# Patient Record
Sex: MALE | Race: WHITE | NOT HISPANIC OR LATINO | Employment: FULL TIME | URBAN - METROPOLITAN AREA
[De-identification: names, ages, dates, MRNs, and addresses within clinical notes are randomized per-mention and may not be internally consistent; named-entity substitution may affect disease eponyms.]

---

## 2020-07-20 ENCOUNTER — ALLIED HEALTH/NURSE VISIT (OUTPATIENT)
Dept: FAMILY MEDICINE | Facility: CLINIC | Age: 45
End: 2020-07-20
Payer: COMMERCIAL

## 2020-07-20 ENCOUNTER — VIRTUAL VISIT (OUTPATIENT)
Dept: FAMILY MEDICINE | Facility: CLINIC | Age: 45
End: 2020-07-20
Payer: COMMERCIAL

## 2020-07-20 DIAGNOSIS — J06.9 VIRAL URI: ICD-10-CM

## 2020-07-20 DIAGNOSIS — J06.9 VIRAL URI: Primary | ICD-10-CM

## 2020-07-20 LAB
DEPRECATED S PYO AG THROAT QL EIA: NEGATIVE
SPECIMEN SOURCE: NORMAL
SPECIMEN SOURCE: NORMAL
STREP GROUP A PCR: NOT DETECTED

## 2020-07-20 PROCEDURE — 99203 OFFICE O/P NEW LOW 30 MIN: CPT | Mod: 95 | Performed by: NURSE PRACTITIONER

## 2020-07-20 PROCEDURE — 99207 ZZC NO CHARGE NURSE ONLY: CPT

## 2020-07-20 PROCEDURE — 40001204 ZZHCL STATISTIC STREP A RAPID: Performed by: NURSE PRACTITIONER

## 2020-07-20 PROCEDURE — 87651 STREP A DNA AMP PROBE: CPT | Performed by: NURSE PRACTITIONER

## 2020-07-20 NOTE — PROGRESS NOTES
"Javier Davis is a 44 year old male who is being evaluated via a billable video visit.      The patient has been notified of following:     \"This video visit will be conducted via a call between you and your physician/provider. We have found that certain health care needs can be provided without the need for an in-person physical exam.  This service lets us provide the care you need with a video conversation.  If a prescription is necessary we can send it directly to your pharmacy.  If lab work is needed we can place an order for that and you can then stop by our lab to have the test done at a later time.    Video visits are billed at different rates depending on your insurance coverage.  Please reach out to your insurance provider with any questions.    If during the course of the call the physician/provider feels a video visit is not appropriate, you will not be charged for this service.\"    Patient has given verbal consent for Video visit? Yes  How would you like to obtain your AVS? Mail a copy  If you are dropped from the video visit, the video invite should be resent to: Text to cell phone: [phone]  Will anyone else be joining your video visit? No    Subjective     Javier Davis is a 44 year old male who presents today via video visit for the following health issues:    HPI    Fatigue       Duration: Today     Description (location/character/radiation): Runny nose, HA, fatigue     Intensity:  mild, moderate    Accompanying signs and symptoms: no fever     History (similar episodes/previous evaluation): None    Precipitating or alleviating factors: exposure to strep     Therapies tried and outcome: ibu      Co worker with strep, friend  No sore throat, afebrile   No cough    Video Start Time: 3:41 PM    Patient Active Problem List   Diagnosis     Strain of groin     Strain of abdominal wall     History reviewed. No pertinent surgical history.    Social History     Tobacco Use     Smoking status: " Current Every Day Smoker     Smokeless tobacco: Never Used   Substance Use Topics     Alcohol use: Yes     History reviewed. No pertinent family history.      No current outpatient medications on file.     No Known Allergies    Reviewed and updated as needed this visit by Provider  Tobacco  Allergies  Meds  Problems  Med Hx  Surg Hx  Fam Hx         Review of Systems   Constitutional, HEENT, cardiovascular, pulmonary, gi and gu systems are negative, except as otherwise noted.      Objective             Physical Exam     GENERAL: Healthy, alert and no distress  EYES: Eyes grossly normal to inspection.  No discharge or erythema, or obvious scleral/conjunctival abnormalities.  RESP: No audible wheeze, cough, or visible cyanosis.  No visible retractions or increased work of breathing.    SKIN: Visible skin clear. No significant rash, abnormal pigmentation or lesions.  NEURO: Cranial nerves grossly intact.  Mentation and speech appropriate for age.  PSYCH: Mentation appears normal, affect normal/bright, judgement and insight intact, normal speech and appearance well-groomed.      Diagnostic Test Results:  Labs reviewed in Epic        Assessment & Plan     1. Viral URI  No acute distress. Will do strep testing with known exposure.  Symptomatic care and follow up discussed.  - Streptococcus A Rapid Scr w Reflx to PCR; Future     Home care instructions were reviewed with the patient. The risks, benefits and treatment options of prescribed medications or other treatments have been discussed with the patient. The patient verbalized their understanding and should call or follow up if no improvement or if they develop further problems.    Return in about 1 week (around 7/27/2020), or if symptoms worsen or fail to improve.    ARMANI Pastrana [facility]      Video-Visit Details    Type of service:  Video Visit    Video End Time:9880    Originating Location (pt. Location): Home    Distant  Location (provider location):  [facility]     Platform used for Video Visit: Doximity    Return in about 1 week (around 7/27/2020), or if symptoms worsen or fail to improve.       ARMANI Pastrana CNP

## 2020-07-20 NOTE — LETTER
July 20, 2020      Javier Davis  [id number] [address] [id number]        To Whom It May Concern:    Javier Davis was seen in our clinic. He missed work 7/20/2020-7/21/2020 due to illness.    Sincerely,        ARMANI Pastrana CNP

## 2020-08-06 ENCOUNTER — NURSE TRIAGE (OUTPATIENT)
Dept: NURSING | Facility: CLINIC | Age: 45
End: 2020-08-06

## 2020-08-06 ENCOUNTER — VIRTUAL VISIT (OUTPATIENT)
Dept: FAMILY MEDICINE | Facility: CLINIC | Age: 45
End: 2020-08-06
Payer: COMMERCIAL

## 2020-08-06 DIAGNOSIS — Z20.822 SUSPECTED COVID-19 VIRUS INFECTION: Primary | ICD-10-CM

## 2020-08-06 PROCEDURE — 99213 OFFICE O/P EST LOW 20 MIN: CPT | Mod: 95 | Performed by: FAMILY MEDICINE

## 2020-08-06 NOTE — PATIENT INSTRUCTIONS
"Discharge Instructions for COVID-19 Patients  You have--or may have--COVID-19. Please follow the instructions listed below.   If you have a weakened immune system, discuss with your doctor any other actions you need to take.  How can I protect others?  If you have symptoms (fever, cough, body aches or trouble breathing):    Stay home and away from others (self-isolate) until:  ? At least 10 days have passed since your symptoms started. And   ? You've had no fever--and no medicine that reduces fever--for 3 full days (72 hours). And   ? Your other symptoms have resolved (gotten better).  If you don't show symptoms, but testing showed that you have COVID-19:    Stay home and away from others (self-isolate) until at least 10 days have passed since the date of your first positive COVID-19 test.  During this time    Stay in your own room, even for meals. Use your own bathroom if you can.    Stay away from others in your home. No hugging, kissing or shaking hands. No visitors.    Don't go to work, school or anywhere else.    Clean \"high touch\" surfaces often (doorknobs, counters, handles). Use household cleaning spray or wipes. You'll find a full list of  on the EPA website: www.epa.gov/pesticide-registration/list-n-disinfectants-use-against-sars-cov-2.    Cover your mouth and nose with a mask, tissue or wash cloth to avoid spreading germs.    Wash your hands and face often. Use soap and water.    Caregivers in these groups are at risk for severe illness due to COVID-19:  ? People 65 years and older  ? People who live in a nursing home or long-term care facility  ? People with chronic disease (lung, heart, cancer, diabetes, kidney, liver, immunologic)  ? People who have a weakened immune system, including those who:    Are in cancer treatment    Take medicine that weakens the immune system, such as corticosteroids    Had a bone marrow or organ transplant    Have an immune deficiency    Have poorly controlled HIV or " AIDS    Are obese (body mass index of 40 or higher)    Smoke regularly    Caregivers should wear gloves while washing dishes, handling laundry and cleaning bedrooms and bathrooms.    Use caution when washing and drying laundry: Don't shake dirty laundry and use the warmest water setting that you can.    For more tips on managing your health at home, go to www.cdc.gov/coronavirus/2019-ncov/downloads/10Things.pdf.  How can I take care of myself at home?  1. Get lots of rest. Drink extra fluids (unless a doctor has told you not to).  2. Take Tylenol (acetaminophen) for fever or pain. If you have liver or kidney problems, ask your family doctor if it's okay to take Tylenol.     Adults can take either:  ? 650 mg (two 325 mg pills) every 4 to 6 hours, or   ? 1,000 mg (two 500 mg pills) every 8 hours as needed.  ? Note: Don't take more than 3,000 mg in one day. Acetaminophen is found in many medicines (both prescribed and over-the-counter medicines). Read all labels to be sure you don't take too much.   For children, check the Tylenol bottle for the right dose. The dose is based on the child's age or weight.  3. If you have other health problems (like cancer, heart failure, an organ transplant or severe kidney disease): Call your specialty clinic if you don't feel better in the next 2 days.  4. Know when to call 911. Emergency warning signs include:  ? Trouble breathing or shortness of breath  ? Pain or pressure in the chest that doesn't go away  ? Feeling confused like you haven't felt before, or not being able to wake up  ? Bluish-colored lips or face  5. Your doctor may have prescribed a blood thinner medicine. Follow their instructions.  Where can I get more information?     [facility] - About COVID-19:   [email or web address]    CDC - What to Do If You're Sick: www.cdc.gov/coronavirus/2019-ncov/about/steps-when-sick.html    CDC - Ending Home Isolation:  www.cdc.gov/coronavirus/2019-ncov/hcp/disposition-in-home-patients.html    CDC - Caring for Someone: www.cdc.gov/coronavirus/2019-ncov/if-you-are-sick/care-for-someone.html    [facility] - Interim Guidance for Hospital Discharge to Home: www.[facility].[place]/diseases/coronavirus/hcp/hospdischarge.pdf    [facility] clinical trials (COVID-19 research studies): clinicalaffairs.[facility]-clinical-trials    Below are the COVID-19 hotlines at the [place] Department of Health ([facility]). Interpreters are available.  ? For health questions: Call 051-573-0269 or 1-106.273.7498 (7 a.m. to 7 p.m.)  ? For questions about schools and childcare: Call 720-197-4978 or 1-469.941.3238 (7 a.m. to 7 p.m.)    For informational purposes only. Not to replace the advice of your health care provider. Clinically reviewed by the Infection Prevention Team.Copyright   2020 [facility]. All rights reserved. [employer] 939059 - 06/20.

## 2020-08-06 NOTE — TELEPHONE ENCOUNTER
Body aches, headache for two days now. Others at work are getting tested for covid19 today.  I connected him with scheduling because he has shortness of breath with walking. He needs to talk with a provider on the telephone to discuss covid19 testing.  Eileen Sanderson RN  [facility]      Reason for Disposition    MILD difficulty breathing (e.g., minimal/no SOB at rest, SOB with walking, pulse <100)    Additional Information    Negative: SEVERE difficulty breathing (e.g., struggling for each breath, speaks in single words)    Negative: Difficult to awaken or acting confused (e.g., disoriented, slurred speech)    Negative: Bluish (or gray) lips or face now    Negative: Shock suspected (e.g., cold/pale/clammy skin, too weak to stand, low BP, rapid pulse)    Negative: Sounds like a life-threatening emergency to the triager    Negative: [1] COVID-19 exposure AND [2] no symptoms    Negative: COVID-19 and Breastfeeding, questions about    Negative: [1] Adult with possible COVID-19 symptoms AND [2] triager concerned about severity of symptoms or other causes    Negative: SEVERE or constant chest pain or pressure (Exception: mild central chest pain, present only when coughing)    Negative: MODERATE difficulty breathing (e.g., speaks in phrases, SOB even at rest, pulse 100-120)    Negative: Patient sounds very sick or weak to the triager    Protocols used: CORONAVIRUS (COVID-19) DIAGNOSED OR [id number] 5.16.20

## 2020-08-06 NOTE — PROGRESS NOTES
"Javier Davis is a 44 year old male who is being evaluated via a billable telephone visit.      The patient has been notified of following:     \"This telephone visit will be conducted via a call between you and your physician/provider. We have found that certain health care needs can be provided without the need for a physical exam.  This service lets us provide the care you need with a short phone conversation.  If a prescription is necessary we can send it directly to your pharmacy.  If lab work is needed we can place an order for that and you can then stop by our lab to have the test done at a later time.    Telephone visits are billed at different rates depending on your insurance coverage. During this emergency period, for some insurers they may be billed the same as an in-person visit.  Please reach out to your insurance provider with any questions.    If during the course of the call the physician/provider feels a telephone visit is not appropriate, you will not be charged for this service.\"    Patient has given verbal consent for Telephone visit?  Yes    What phone number would you like to be contacted at? [phone]    How would you like to obtain your AVS? Mail a copy    Subjective     Javier Davis is a 44 year old male who presents via phone visit today for the following health issues:    HPI    Patient reports for the past 2 days has been having symptoms of cough, fever chills, headache.  He reports other coworker has similar symptoms however all tested negative.  His work wanted him to be tested.  Currently,  is under self-isolation.    He denies loss of smell sensation.  Denies loss of taste.  He has no chest pain no short of breath.  Denies being lightheaded or dizzy.  Eating drinking well.  Concern for COVID-19  About how many days ago did these symptoms start? Monday  Is this your first visit for this illness? Yes  In the 14 days before your symptoms started, have you had close contact " with someone with COVID-19 (Coronavirus)? Yes, I have been in contact with someone who has symptoms of COVID-19/Coronavirus (no lab test done or waiting on results).  Do you have a fever or chills? No  Are you having new or worsening difficulty breathing? No  Do you have new or worsening cough? No  Have you had any new or unexplained body aches? YES    Have you experienced any of the following NEW symptoms?    Headache: YES    Sore throat: No    Loss of taste or smell: No    Chest pain: fatigue    Diarrhea: YES    Rash: No  What treatments have you tried? Pepto on Tuesday  Who do you live with? Girlfriend, two children , and a coworker  Are you, or a household member, a healthcare worker or a ? No  Do you live in a nursing home, group home, or shelter? No  Do you have a way to get food/medications if quarantined? Yes, I have a friend or family member who can help me.      Patient Active Problem List   Diagnosis     Strain of groin     Strain of abdominal wall     History reviewed. No pertinent surgical history.    Social History     Tobacco Use     Smoking status: Current Every Day Smoker     Smokeless tobacco: Never Used   Substance Use Topics     Alcohol use: Yes     History reviewed. No pertinent family history.      No current outpatient medications on file.     No Known Allergies    Reviewed and updated as needed this visit by Provider         Review of Systems   Constitutional, HEENT, cardiovascular, pulmonary, gi and gu systems are negative, except as otherwise noted.       Objective   Reported vitals:  There were no vitals taken for this visit.   healthy, alert, active and no distress  Speak in full sentence.  PSYCH: Alert and oriented times 3; coherent speech, normal   rate and volume, able to articulate logical thoughts, able   to abstract reason, no tangential thoughts, no hallucinations   or delusions  His affect is normal and pleasant  RESP: No cough, no audible wheezing, able to talk in  "full sentences  Remainder of exam unable to be completed due to telephone visits    Diagnostic Test Results:  Labs reviewed in Epic  Orders Placed This Encounter   Procedures     Symptomatic COVID-19 Virus (Coronavirus) by PCR        Assessment/Plan:    1. Suspected COVID-19 virus infection    - Symptomatic COVID-19 Virus (Coronavirus) by PCR; Future    No follow-ups on file.    Patient Instructions   Discharge Instructions for COVID-19 Patients  You have--or may have--COVID-19. Please follow the instructions listed below.   If you have a weakened immune system, discuss with your doctor any other actions you need to take.  How can I protect others?  If you have symptoms (fever, cough, body aches or trouble breathing):    Stay home and away from others (self-isolate) until:  ? At least 10 days have passed since your symptoms started. And   ? You've had no fever--and no medicine that reduces fever--for 3 full days (72 hours). And   ? Your other symptoms have resolved (gotten better).  If you don't show symptoms, but testing showed that you have COVID-19:    Stay home and away from others (self-isolate) until at least 10 days have passed since the date of your first positive COVID-19 test.  During this time    Stay in your own room, even for meals. Use your own bathroom if you can.    Stay away from others in your home. No hugging, kissing or shaking hands. No visitors.    Don't go to work, school or anywhere else.    Clean \"high touch\" surfaces often (doorknobs, counters, handles). Use household cleaning spray or wipes. You'll find a full list of  on the EPA website: www.epa.gov/pesticide-registration/list-n-disinfectants-use-against-sars-cov-2.    Cover your mouth and nose with a mask, tissue or wash cloth to avoid spreading germs.    Wash your hands and face often. Use soap and water.    Caregivers in these groups are at risk for severe illness due to COVID-19:  ? People 65 years and older  ? People who live " in a nursing home or long-term care facility  ? People with chronic disease (lung, heart, cancer, diabetes, kidney, liver, immunologic)  ? People who have a weakened immune system, including those who:    Are in cancer treatment    Take medicine that weakens the immune system, such as corticosteroids    Had a bone marrow or organ transplant    Have an immune deficiency    Have poorly controlled HIV or AIDS    Are obese (body mass index of 40 or higher)    Smoke regularly    Caregivers should wear gloves while washing dishes, handling laundry and cleaning bedrooms and bathrooms.    Use caution when washing and drying laundry: Don't shake dirty laundry and use the warmest water setting that you can.    For more tips on managing your health at home, go to www.cdc.gov/coronavirus/2019-ncov/downloads/10Things.pdf.  How can I take care of myself at home?  1. Get lots of rest. Drink extra fluids (unless a doctor has told you not to).  2. Take Tylenol (acetaminophen) for fever or pain. If you have liver or kidney problems, ask your family doctor if it's okay to take Tylenol.     Adults can take either:  ? 650 mg (two 325 mg pills) every 4 to 6 hours, or   ? 1,000 mg (two 500 mg pills) every 8 hours as needed.  ? Note: Don't take more than 3,000 mg in one day. Acetaminophen is found in many medicines (both prescribed and over-the-counter medicines). Read all labels to be sure you don't take too much.   For children, check the Tylenol bottle for the right dose. The dose is based on the child's age or weight.  3. If you have other health problems (like cancer, heart failure, an organ transplant or severe kidney disease): Call your specialty clinic if you don't feel better in the next 2 days.  4. Know when to call 911. Emergency warning signs include:  ? Trouble breathing or shortness of breath  ? Pain or pressure in the chest that doesn't go away  ? Feeling confused like you haven't felt before, or not being able to wake  up  ? Bluish-colored lips or face  5. Your doctor may have prescribed a blood thinner medicine. Follow their instructions.  Where can I get more information?    [facility] - About COVID-19:   [email or web address]    CDC - What to Do If You're Sick: www.cdc.gov/coronavirus/2019-ncov/about/steps-when-sick.html    CDC - Ending Home Isolation: www.cdc.gov/coronavirus/2019-ncov/hcp/disposition-in-home-patients.html    [facility] - Caring for Someone: www.cdc.gov/coronavirus/2019-ncov/if-you-are-sick/care-for-someone.html    [facility] - Interim Guidance for Hospital Discharge to Home: www.[facility].[place]/diseases/coronavirus/hcp/hospdischarge.pdf    [facility] clinical trials (COVID-19 research studies): clinicalaffairs.[facility]-clinical-trials    Below are the COVID-19 hotlines at the [place] Department of Health ([facility]). Interpreters are available.  ? For health questions: Call 654-203-1380 or 1-177.880.2870 (7 a.m. to 7 p.m.)  ? For questions about schools and childcare: Call 310-857-4887 or 1-309.207.9472 (7 a.m. to 7 p.m.)    For informational purposes only. Not to replace the advice of your health care provider. Clinically reviewed by the Infection Prevention Team.Copyright   2020 [facility]. All rights reserved. [employer] 064358 - 06/20.        Phone call duration: 5  minutes    Nanette Sanabria MD

## 2020-08-07 ENCOUNTER — AMBULATORY - HEALTHEAST (OUTPATIENT)
Dept: FAMILY MEDICINE | Facility: CLINIC | Age: 45
End: 2020-08-07

## 2020-08-07 DIAGNOSIS — Z20.822 SUSPECTED COVID-19 VIRUS INFECTION: ICD-10-CM

## 2020-08-08 ENCOUNTER — AMBULATORY - HEALTHEAST (OUTPATIENT)
Dept: FAMILY MEDICINE | Facility: CLINIC | Age: 45
End: 2020-08-08

## 2020-08-08 DIAGNOSIS — Z20.822 SUSPECTED COVID-19 VIRUS INFECTION: ICD-10-CM

## 2020-08-11 ENCOUNTER — COMMUNICATION - HEALTHEAST (OUTPATIENT)
Dept: SCHEDULING | Facility: CLINIC | Age: 45
End: 2020-08-11

## 2020-08-13 ENCOUNTER — APPOINTMENT (OUTPATIENT)
Dept: OCCUPATIONAL MEDICINE | Facility: CLINIC | Age: 45
End: 2020-08-13

## 2020-08-13 PROCEDURE — 99000 SPECIMEN HANDLING OFFICE-LAB: CPT | Performed by: FAMILY MEDICINE

## 2021-07-29 ENCOUNTER — HOSPITAL ENCOUNTER (EMERGENCY)
Facility: CLINIC | Age: 46
Discharge: HOME | End: 2021-07-29
Attending: EMERGENCY MEDICINE | Admitting: EMERGENCY MEDICINE

## 2021-07-29 VITALS
HEIGHT: 72 IN | RESPIRATION RATE: 18 BRPM | TEMPERATURE: 98 F | SYSTOLIC BLOOD PRESSURE: 140 MMHG | DIASTOLIC BLOOD PRESSURE: 85 MMHG | WEIGHT: 168 LBS | HEART RATE: 66 BPM | BODY MASS INDEX: 22.75 KG/M2 | OXYGEN SATURATION: 99 %

## 2021-07-29 DIAGNOSIS — G56.02 CARPAL TUNNEL SYNDROME OF LEFT WRIST: Primary | ICD-10-CM

## 2021-07-29 PROCEDURE — 250N000012 HC RX MED GY IP 250 OP 636 PS 637: Performed by: EMERGENCY MEDICINE

## 2021-07-29 PROCEDURE — 250N000013 HC RX MED GY IP 250 OP 250 PS 637: Performed by: EMERGENCY MEDICINE

## 2021-07-29 PROCEDURE — 99284 EMERGENCY DEPT VISIT MOD MDM: CPT | Performed by: EMERGENCY MEDICINE

## 2021-07-29 PROCEDURE — 99283 EMERGENCY DEPT VISIT LOW MDM: CPT | Performed by: EMERGENCY MEDICINE

## 2021-07-29 RX ORDER — PREDNISONE 10 MG/1
TABLET ORAL
Qty: 32 TABLET | Refills: 0 | Status: SHIPPED | OUTPATIENT
Start: 2021-07-29 | End: 2021-08-08

## 2021-07-29 RX ORDER — HYDROCODONE BITARTRATE AND ACETAMINOPHEN 5; 325 MG/1; MG/1
2 TABLET ORAL ONCE
Status: COMPLETED | OUTPATIENT
Start: 2021-07-29 | End: 2021-07-29

## 2021-07-29 RX ORDER — PREDNISONE 20 MG/1
40 TABLET ORAL ONCE
Status: COMPLETED | OUTPATIENT
Start: 2021-07-29 | End: 2021-07-29

## 2021-07-29 RX ADMIN — HYDROCODONE BITARTRATE AND ACETAMINOPHEN 2 TABLET: 5; 325 TABLET ORAL at 04:21

## 2021-07-29 RX ADMIN — PREDNISONE 40 MG: 20 TABLET ORAL at 04:21

## 2021-07-29 ASSESSMENT — ENCOUNTER SYMPTOMS
WOUND: 0
CONSTITUTIONAL NEGATIVE: 1

## 2021-07-29 ASSESSMENT — MIFFLIN-ST. JEOR: SCORE: 1675.04

## 2021-07-29 NOTE — ED PROVIDER NOTES
History     Chief Complaint   Patient presents with     Arm Pain     HPI  Javier Davis is a 45 year old male who is left-hand dominant, presenting the emergency department with concerns regarding left hand and left arm pain.  Patient states he awoke during the overnight hours with pain especially in the third and fourth digits of the left hand, with some pain radiating towards the left elbow.  Does not extend to the neck.  There is no recent trauma or other injury.  He also felt as if his hand was cold.  No chest pain.  No shortness of breath.  No right upper extremity symptoms.  Patient works as a construction individual, and is constructing decks currently    Allergies:  No Known Allergies    Problem List:    Patient Active Problem List    Diagnosis Date Noted     Strain of groin 10/30/2012     Priority: Medium     Strain of abdominal wall 10/30/2012     Priority: Medium        Past Medical History:    No past medical history on file.    Past Surgical History:    No past surgical history on file.    Family History:    No family history on file.    Social History:  Marital Status:  Single [1]  Social History     Tobacco Use     Smoking status: Current Every Day Smoker     Smokeless tobacco: Never Used   Substance Use Topics     Alcohol use: Yes     Drug use: Never        Medications:    predniSONE (DELTASONE) 10 MG tablet          Review of Systems   Constitutional: Negative.    Musculoskeletal:        See HPI   Skin: Negative for rash and wound.       Physical Exam   BP: (!) 140/85  Pulse: 66  Temp: 98  F (36.7  C)  Resp: 18  Height: 182.9 cm (6')  Weight: 76.2 kg (168 lb)  SpO2: 99 %      Physical Exam  BP (!) 140/85   Pulse 66   Temp 98  F (36.7  C) (Oral)   Resp 18   Ht 1.829 m (6')   Wt 76.2 kg (168 lb)   SpO2 99%   BMI 22.78 kg/m    General: alert and in no acute distress  Head: atraumatic, normocephalic  Abd: nondistended  Musculoskel/Extremities: Left upper extremity with normal perfusion,  capillary refill, and sensation.  Normal strength.  Does have tenderness to palpation in the carpal tunnel region with increased amounts of pain with flexion of the left wrist   neuro: Patient awake, alert, oriented, speech is fluent, gait is normal  Psychiatric: affect/mood normal, cooperative, normal judgement/insight and memory intact      ED Course        Procedures              Critical Care time:  none               No results found for this or any previous visit (from the past 24 hour(s)).    Medications   predniSONE (DELTASONE) tablet 40 mg (has no administration in time range)   HYDROcodone-acetaminophen (NORCO) 5-325 MG per tablet 2 tablet (has no administration in time range)       Assessments & Plan (with Medical Decision Making)  45 year old male, left-hand-dominant, presenting to the emergency department with concerns regarding left wrist pain.  There is also pain in the left arm, and digits of the left upper extremity, primarily the third and fourth.  Upon further discussion with the patient, he reports that it is the radial aspect of the fourth digit.  This is in the median nerve distribution.  I feel that this does represent carpal tunnel syndrome.  Patient will be referred to outpatient hand surgeon.  Encouraged anti-inflammatories.  Steroids will be prescribed.  Wrist splint provided.  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     I have reviewed the nursing notes.    I have reviewed the findings, diagnosis, plan and need for follow up with the patient.          New Prescriptions    PREDNISONE (DELTASONE) 10 MG TABLET    Take 4 tablets daily for 5 days,  take 2 tablets daily for 3 days, take 1 tablet daily for 3 days, take half a tablet for 3 days.       Final diagnoses:   Carpal tunnel syndrome of left wrist       7/29/2021   [facility] EMERGENCY DEPT     Carlos Hummel MD  07/29/21 0423

## 2021-07-29 NOTE — ED TRIAGE NOTES
"Left arm pain and numbness woke up about an hour ago and arm was \"asleep and fingers were cold\" has been coming and going for 4 days.   "

## 2021-08-29 ENCOUNTER — HEALTH MAINTENANCE LETTER (OUTPATIENT)
Age: 46
End: 2021-08-29

## 2021-10-24 ENCOUNTER — HEALTH MAINTENANCE LETTER (OUTPATIENT)
Age: 46
End: 2021-10-24

## 2022-10-15 ENCOUNTER — HEALTH MAINTENANCE LETTER (OUTPATIENT)
Age: 47
End: 2022-10-15

## 2023-09-27 ENCOUNTER — OFFICE VISIT (OUTPATIENT)
Dept: URGENT CARE | Facility: URGENT CARE | Age: 48
End: 2023-09-27
Payer: OTHER MISCELLANEOUS

## 2023-09-27 VITALS
WEIGHT: 182 LBS | TEMPERATURE: 97.7 F | OXYGEN SATURATION: 100 % | SYSTOLIC BLOOD PRESSURE: 129 MMHG | DIASTOLIC BLOOD PRESSURE: 72 MMHG | HEART RATE: 56 BPM | BODY MASS INDEX: 24.68 KG/M2 | RESPIRATION RATE: 16 BRPM

## 2023-09-27 DIAGNOSIS — S06.0X0A CONCUSSION WITHOUT LOSS OF CONSCIOUSNESS, INITIAL ENCOUNTER: Primary | ICD-10-CM

## 2023-09-27 PROCEDURE — 99203 OFFICE O/P NEW LOW 30 MIN: CPT

## 2023-09-27 NOTE — PROGRESS NOTES
URGENT CARE  Assessment & Plan   Assessment:   Javier Davis is a 47 year old male who's clinical presentation today is consistent with:   1. Concussion without loss of consciousness, initial encounter  - Concussion  Referral; Future  Plan:  Patient is well appearing, has no alarm signs such as: vomiting, slurred speech, visual changes or mentation changes that would suggest a more serious injury related to their head trauma, additionally patient has no orientation or memory issues, no scalp or skull abnormalities, and no cervical spine tenderness, no focal neurological deficits noted  and thus, will treat patient at this time for a mild post-concussive syndrome, post blunt head trauma. This will include encouraging: Good sleep hygiene and rest, Avoid excessive stress, and Limiting intake of caffeine, alcohol, and nicotine  Recommended patient be off of work until symptoms have begun to resolve, however he would like to go back today, additionally discussed with patient they need to follow up with the concussion clinic next week for a recheck of symptoms and to discuss the return to activity steps and  process. However is symptoms worsen over the next week they need to present to the ED immediately (warning symptoms discussed)   For the headache they were educated they can use Tylenol/acetaminophen, no ibuprofen for the first week   No alarm signs or symptoms present   Differential Diagnoses for this patient's chief complaint that I considered include:  Intercranial hemorrhage or hematoma, Brain contusion, cortical contusion, TBI, skull fracture, seizure      Patient is} agreeable to treatment plan and state they will follow-up if symptoms do not improve and/or if symptoms worsen   see patient's AVS 'monitor for' section for specific patient instructions given and discussed regarding what to watch for and when to follow up    ARMANI Jones [facility] [place]  [facility]      ______________________________________________________________________      Subjective     HPI: Javier Davis  is a 47 year old  male who presents today for evaluation the following concerns:   Patient presents  today stating that they hit their head while at work two days ago.   Patient reports incident happened after he turned and hit his head on a shelf with a piece of metal sticking out of it, he also notes it scratched his face slightly   Report from the incident was that the patient did not sustain any loss of consciousness    Patient endorses a headache yesterday, but denies any: nausea, fatigue, balance issues, memory issues  Patient denies any Vomiting, slurred speech, visual changes, mentation changes, orientation, memory, concentration and balance issues   Patient denies any Scalp or skull abnormalities, cervical spine tenderness, patient denies any new signs of deteriorating neuro function or any focal neurological deficits. Patient denies any open areas to the head, patient states skin is intact.    Review of Systems:  Pertinent review of systems as reflected in HPI, otherwise negative.     Objective    Physical Exam:  Vitals:    09/27/23 1222   BP: 129/72   Pulse: 56   Resp: 16   Temp: 97.7  F (36.5  C)   TempSrc: Tympanic   SpO2: 100%   Weight: 82.6 kg (182 lb)      General:   alert and oriented, no acute distress, non ill-appearing   Vital signs reviewed: afebrile and normotensive     SKIN: granulated lac noted to right cheek   HEENT: Neck can flex, rotate without symptoms  No battles, racoons, hemotympanum  Perrla, extra ocular motor intact without} discomfort  Tracking normal,  reads well, convergence normal  Neuro:   No Vomiting, slurred speech, visual changes, mentation changes, orientation, memory, concentration and balance issues noted, no Scalp or skull abnormalities or cervical spine tenderness, no signs of deteriorating neuro function, no any focal neurological  deficits  LUNG: normal work of breathing, good respiratory effort without retractions, good air  movement, non labored, inspection reveals normal chest expansion w/ inspiration     ______________________________________________________________________    I explained my diagnostic considerations and recommendations to the patient, who voiced understanding and agreement with the treatment plan.   All questions were answered.   We discussed potential side effects, risks and benefits of any prescribed or recommended therapies, as well as expectations for response to treatments.  Please see AVS for any patient instructions & handouts given.   Patient was advised to contact the Nurse Care Line, their Primary Care provider, Urgent Care, or the Emergency Department if there are new or worsening symptoms, or call 911 for emergencies.

## 2023-09-27 NOTE — LETTER
September 27, 2023      Javier Davis  [address] [address] [facility] [place] [address]        To Whom It May Concern:    Javier Davis was seen in our clinic. He may return to work today, 9/27/23.       Sincerely,        ARMANI Jones CNP

## 2023-09-27 NOTE — PATIENT INSTRUCTIONS
"Diagnosis: concussion _ blunt head trauma   At this point your symptoms do sound concerning for concussion. Recommending as much rest as possible.   Your symptoms should resolve over the next 2 to 3 days, but in the meantime be as quiet as possible.     Plan:   Good sleep hygiene and rest   The advice of: 'not sleeping' 'don't go to sleep' is \"old wives tale\"   Was based on the fear of instances when a concussion was actually a brain bleed or brain blood clot and was undetected  REST is BEST - brain is hurt and swollen, needs to rest, swelling needs to go down, only way is with rest   This includes decreased screen time, reading, listening to music, or other types of physical activity.   The brain requires complete rest to recover from a concussion.  Recommendation is to be off of work (or school) until symptoms begin to resolve, (time frame is different for everyone)   when you do go back to work/school and if symptoms worsen or return it is important that you return home and resume resting.  Avoid excessive stress  Limit intake of caffeine, alcohol, and nicotine  Need to follow up with a PCP in the next week for a recheck of symptoms   Pain- for headache can use Tylenol/acetaminophen,   but avoid any of the NSAIDs (ibuprofen, Advil, Motrin, naproxen, Aleve, aspirin) as these can increase the risk of bleeding.  Monitor for:   Loss of consciousness, mental status changes   Dilated pupils   Trouble walking   Trouble talking, making nonsensical statements   Decreased orientation   Slurred speech   Vomiting   Visual changes   Dizziness  Scalp or skull abnormalities   Cervical spine tenderness   New numbness or tingling   Concussion:   Is a closed head injury due to a direct blow to the head or a deceleration of the head from an impulsive force  Usually results in a transient change in mental status - should be a short-lived impairment of neuro function only that resolved spontaneously   Some \"Foggy memory\", " concentration issues and balance issues are normal and can last up to 1-2 months after injury.   Also normal: headache, emotional lability, and irritability   Important not to re-injure or re-concuss in next 6 months, evidence from boxer/foot ball athletes suggests can have prolonged consequences to brain function

## 2023-10-05 ENCOUNTER — OFFICE VISIT (OUTPATIENT)
Dept: PHYSICAL MEDICINE AND REHAB | Facility: CLINIC | Age: 48
End: 2023-10-05
Payer: OTHER MISCELLANEOUS

## 2023-10-05 DIAGNOSIS — S06.0X0A CONCUSSION WITHOUT LOSS OF CONSCIOUSNESS, INITIAL ENCOUNTER: ICD-10-CM

## 2023-10-05 PROCEDURE — 99205 OFFICE O/P NEW HI 60 MIN: CPT | Performed by: PHYSICAL MEDICINE & REHABILITATION

## 2023-10-05 NOTE — PROGRESS NOTES
.       PM&R Clinic Note     Patient Name: Javier Davis : 1975 Medical Record: [id number]     Requesting Physician/clinician: No att. providers found           History of Present Illness:     Javier Davis is a 47 year old male referred for concussion evaluation and management. Present today with his son    Per  notes 23: Patient presents  today stating that they hit their head while at work two days ago.   Patient reports incident happened after he turned and hit his head on a shelf with a piece of metal sticking out of it, he also notes it scratched his face slightly   Report from the incident was that the patient did not sustain any loss of consciousness    Patient endorses a headache yesterday, but denies any: nausea, fatigue, balance issues, memory issues  Patient denies any Vomiting, slurred speech, visual changes, mentation changes, orientation, memory, concentration and balance issues   Patient denies any Scalp or skull abnormalities, cervical spine tenderness, patient denies any new signs of deteriorating neuro function or any focal neurological deficits. Patient denies any open areas to the head, patient states skin is intact.    Referred to concussion clinic for evaluation    Today, he reports the following:    Reports having bad HA but got better to no HA. Last HA was two days ago.  No reports of dizziness, hearing loss, memory issues, mood concerns, CP/ SOB.  Reports vision issues with challenges with far vision on the right side. No double vision or vision loss reported.    Patient feels he is 95% and the only 5% is due to his vision. Due to see the vision specialist.    Functionally, independent with ADLs and iADLs             Past Medical and Surgical History:     No past medical history on file.  No past surgical history on file.         Social History:     Social History     Tobacco Use    Smoking status: Every Day    Smokeless tobacco: Never   Substance Use Topics     Alcohol use: Yes            Functional history:       ADLs: as above  iADLs (medication management and finances): as above         Family History:     No family history on file.         Medications:     No current outpatient medications on file.            Allergies:     No Known Allergies           ROS:     A focused ROS is negative other than the symptoms noted above in the HPI.           Physical Examiniation:     VITAL SIGNS: There were no vitals taken for this visit.  BMI: Estimated body mass index is 24.68 kg/m  as calculated from the following:    Height as of 7/29/21: 1.829 m (6').    Weight as of 9/27/23: 82.6 kg (182 lb).    Gen: NAD, pleasant and cooperative   Neuro/MSK:   Normal complete neuro exam  No UMN signs identified           Laboratory/Imaging:     No Ctbrain noted in Epic    INDICATION:  Left upper quadrant abdominal pain.    Technique:  Multiple axial images were obtained from the diaphragm to symphysis pubis  after administration of 80 mL of Omnipaque 350 intravenously.  Sagittal  and coronal re-formatted images were obtained.    Comparison:  None.    Findings:  The visualized portion of the lung bases are clear.  There is no focal  liver lesion.  The spleen, pancreas, gallbladder and adrenal glands are  remarkable.  In the midportion of the left kidney, there is a 0.8 cm cyst.   There is no mass in the right kidney.  There is no hydronephrosis.  There  is no evidence of a bowel obstruction.  The appendix is visualized in the  right lower quadrant and is unremarkable.  The abdominal aorta is normal  in caliber. There is no adenopathy seen.  There are degenerative changes  in the spine.    Impression:   1. No acute abnormality on CT scan abdomen and pelvis with contrast.   2. 0.8 cm cyst left kidney.    Please note that all CT scans at this facility use dose modulation,  iterative reconstruction, and/or weight-based dosing when appropriate to  reduce radiation dose to as low as reasonably  achievable.    Dictated by Justino Guerrero MD @ Jul 28 2017 12:22PM  Images on Order [id number]           Assessment/Plan:     Concussion without loss of consciousness, initial encounter  1. Concussion without loss of consciousness, initial encounter    - Concussion  Referral    Patient education: In depth discussion and education was provided about the assessment and implications of each of the below recommendations for management. Patient indicated readiness to learn, all questions were answered and understanding of material presented was confirmed.    Work-up: none needed at this time    Therapy/equipment/braces: none needed at this time    Medications: none needed at this time    Interventions: none needed at this time    Referral / follow up with other providers: none needed at this time    Follow up: no follow up required. Patient can return to work with no restrictions. Letter provided today    Lin Byrd MD  Physical Medicine & Rehabilitation      66 minutes spent on the date of the encounter reviewing EPIC notes including ED/UC notes, therapy notes, family care notes, care-everywhere, labs and history and exam documentation. I personally reviewed the image and further activities as noted above.

## 2023-10-05 NOTE — LETTER
10/5/2023         RE: Javier Davis  [address] [address] [address]        Dear Colleague,    Thank you for referring your patient, Jvaier Davis, to the [facility]. Please see a copy of my visit note below.    .       PM&R Clinic Note     Patient Name: Javier Davis : 1975 Medical Record: [id number]     Requesting Physician/clinician: No att. providers found           History of Present Illness:     Javier Davis is a 47 year old male referred for concussion evaluation and management. Present today with his son    Per  notes 23: Patient presents  today stating that they hit their head while at work two days ago.   Patient reports incident happened after he turned and hit his head on a shelf with a piece of metal sticking out of it, he also notes it scratched his face slightly   Report from the incident was that the patient did not sustain any loss of consciousness    Patient endorses a headache yesterday, but denies any: nausea, fatigue, balance issues, memory issues  Patient denies any Vomiting, slurred speech, visual changes, mentation changes, orientation, memory, concentration and balance issues   Patient denies any Scalp or skull abnormalities, cervical spine tenderness, patient denies any new signs of deteriorating neuro function or any focal neurological deficits. Patient denies any open areas to the head, patient states skin is intact.    Referred to concussion clinic for evaluation    Today, he reports the following:    Reports having bad HA but got better to no HA. Last HA was two days ago.  No reports of dizziness, hearing loss, memory issues, mood concerns, CP/ SOB.  Reports vision issues with challenges with far vision on the right side. No double vision or vision loss reported.    Patient feels he is 95% and the only 5% is due to his vision. Due to see the vision specialist.    Functionally, independent with ADLs and  iADLs             Past Medical and Surgical History:     No past medical history on file.  No past surgical history on file.         Social History:     Social History     Tobacco Use     Smoking status: Every Day     Smokeless tobacco: Never   Substance Use Topics     Alcohol use: Yes            Functional history:       ADLs: as above  iADLs (medication management and finances): as above         Family History:     No family history on file.         Medications:     No current outpatient medications on file.            Allergies:     No Known Allergies           ROS:     A focused ROS is negative other than the symptoms noted above in the HPI.           Physical Examiniation:     VITAL SIGNS: There were no vitals taken for this visit.  BMI: Estimated body mass index is 24.68 kg/m  as calculated from the following:    Height as of 7/29/21: 1.829 m (6').    Weight as of 9/27/23: 82.6 kg (182 lb).    Gen: NAD, pleasant and cooperative   Neuro/MSK:   Normal complete neuro exam  No UMN signs identified           Laboratory/Imaging:     No Ctbrain noted in Epic    INDICATION:  Left upper quadrant abdominal pain.    Technique:  Multiple axial images were obtained from the diaphragm to symphysis pubis  after administration of 80 mL of Omnipaque 350 intravenously.  Sagittal  and coronal re-formatted images were obtained.    Comparison:  None.    Findings:  The visualized portion of the lung bases are clear.  There is no focal  liver lesion.  The spleen, pancreas, gallbladder and adrenal glands are  remarkable.  In the midportion of the left kidney, there is a 0.8 cm cyst.   There is no mass in the right kidney.  There is no hydronephrosis.  There  is no evidence of a bowel obstruction.  The appendix is visualized in the  right lower quadrant and is unremarkable.  The abdominal aorta is normal  in caliber. There is no adenopathy seen.  There are degenerative changes  in the spine.    Impression:   1. No acute abnormality on  CT scan abdomen and pelvis with contrast.   2. 0.8 cm cyst left kidney.    Please note that all CT scans at this facility use dose modulation,  iterative reconstruction, and/or weight-based dosing when appropriate to  reduce radiation dose to as low as reasonably achievable.    Dictated by Justino Guerrero MD @ Jul 28 2017 12:22PM  Images on Order [id number]           Assessment/Plan:     Concussion without loss of consciousness, initial encounter  1. Concussion without loss of consciousness, initial encounter    - Concussion  Referral    Patient education: In depth discussion and education was provided about the assessment and implications of each of the below recommendations for management. Patient indicated readiness to learn, all questions were answered and understanding of material presented was confirmed.    Work-up: none needed at this time    Therapy/equipment/braces: none needed at this time    Medications: none needed at this time    Interventions: none needed at this time    Referral / follow up with other providers: none needed at this time    Follow up: no follow up required. Patient can return to work with no restrictions. Letter provided today    Lin Byrd MD  Physical Medicine & Rehabilitation      66 minutes spent on the date of the encounter reviewing EPIC notes including ED/UC notes, therapy notes, family care notes, care-everywhere, labs and history and exam documentation. I personally reviewed the image and further activities as noted above.                Again, thank you for allowing me to participate in the care of your patient.        Sincerely,        Lin Byrd MD

## 2023-10-05 NOTE — LETTER
October 5, 2023      Javier Davis  [address] [facility] [address]        To Whom It May Concern:    Javier Davis was seen in our clinic. He may return to work without restrictions.      Sincerely,        Lin Byrd MD

## 2023-10-29 ENCOUNTER — HEALTH MAINTENANCE LETTER (OUTPATIENT)
Age: 48
End: 2023-10-29

## 2023-10-31 ENCOUNTER — OFFICE VISIT (OUTPATIENT)
Dept: URGENT CARE | Facility: URGENT CARE | Age: 48
End: 2023-10-31

## 2023-10-31 VITALS
OXYGEN SATURATION: 99 % | BODY MASS INDEX: 23.6 KG/M2 | HEART RATE: 70 BPM | TEMPERATURE: 98.5 F | RESPIRATION RATE: 18 BRPM | DIASTOLIC BLOOD PRESSURE: 71 MMHG | SYSTOLIC BLOOD PRESSURE: 120 MMHG | WEIGHT: 174 LBS

## 2023-10-31 DIAGNOSIS — Z72.0 TOBACCO ABUSE: ICD-10-CM

## 2023-10-31 DIAGNOSIS — R52 BODY ACHES: ICD-10-CM

## 2023-10-31 DIAGNOSIS — R07.0 THROAT PAIN: ICD-10-CM
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
## 2023-10-31 LAB
DEPRECATED S PYO AG THROAT QL EIA: NEGATIVE
FLUAV AG SPEC QL IA: NEGATIVE
FLUBV AG SPEC QL IA: NEGATIVE
GROUP A STREP BY PCR: NOT DETECTED

## 2023-10-31 PROCEDURE — 99214 OFFICE O/P EST MOD 30 MIN: CPT | Performed by: NURSE PRACTITIONER

## 2023-10-31 PROCEDURE — 87651 STREP A DNA AMP PROBE: CPT | Performed by: NURSE PRACTITIONER

## 2023-10-31 PROCEDURE — 86618 LYME DISEASE ANTIBODY: CPT | Performed by: NURSE PRACTITIONER

## 2023-10-31 PROCEDURE — 36415 COLL VENOUS BLD VENIPUNCTURE: CPT | Performed by: NURSE PRACTITIONER

## 2023-10-31 PROCEDURE — 87804 INFLUENZA ASSAY W/OPTIC: CPT | Performed by: NURSE PRACTITIONER

## 2023-10-31 PROCEDURE — 87635 SARS-COV-2 COVID-19 AMP PRB: CPT | Performed by: NURSE PRACTITIONER

## 2023-10-31 RX ORDER — DOXYCYCLINE HYCLATE 100 MG
100 TABLET ORAL 2 TIMES DAILY
Qty: 20 TABLET | Refills: 0 | Status: SHIPPED | OUTPATIENT
Start: 2023-10-31 | End: 2023-11-10

## 2023-10-31 NOTE — PROGRESS NOTES
Assessment & Plan       1. Tick bite, unspecified site, initial encounter  Due to symptoms and recent exposure we will test for Lyme and treat prophylactically with doxycycline discussed side effects of this antibiotic.  Pending strep culture and COVID test patient will continue to self isolate for this.  Discussed smoking cessation patient has been working on this through his primary care provider.  We discussed red flag symptoms that would warrant emergent or urgent evaluation patient verbalized understanding was agreement this plan.  - doxycycline hyclate (VIBRA-TABS) 100 MG tablet; Take 1 tablet (100 mg) by mouth 2 times daily for 10 days  Dispense: 20 tablet; Refill: 0  - Lyme Disease Total Abs Bld with Reflex to Confirm CLIA    2. Body aches      - Influenza A & B Antigen - Clinic Collect  - Symptomatic COVID-19 Virus (Coronavirus) by PCR Nose  - doxycycline hyclate (VIBRA-TABS) 100 MG tablet; Take 1 tablet (100 mg) by mouth 2 times daily for 10 days  Dispense: 20 tablet; Refill: 0  - Lyme Disease Total Abs Bld with Reflex to Confirm CLIA    3. Throat pain      - Influenza A & B Antigen - Clinic Collect  - Symptomatic COVID-19 Virus (Coronavirus) by PCR Nose  - Streptococcus A Rapid Screen w/Reflex to PCR - Clinic Collect  - Group A Streptococcus PCR Throat Swab    4. Tobacco abuse          ARMAN[facility]    Sada Herrera is a 47 year old male who presents to clinic today for the following health issues:  Chief Complaint   Patient presents with    Generalized Body Aches     Body aches and fatigue, vomiting, for the last 3 days.     HPI    Patient presents to clinic states for the past 3 days he has been having body aches, fatigue 2-3 episodes of vomiting.  He states he has been tolerating fluids with electrolyte replacement beverages.  Normal urine output.  Denies diarrhea.  Denies fever.  Denies cough or shortness of breath.  States approximately  3 weeks ago he removed 3 wood ticks from his body 1 on his abdomen and 2 on his right upper extremity he states that he feels they may have been embedded for 1 to 2 days.  History of tobacco use states he has a slight cough but this is not unusual for him denies shortness of breath or chest pain.      Review of Systems  Constitutional, HEENT, cardiovascular, pulmonary, gi and gu systems are negative, except as otherwise noted.      Objective    /71   Pulse 70   Temp 98.5  F (36.9  C) (Tympanic)   Resp 18   Wt 78.9 kg (174 lb)   SpO2 99%   BMI 23.60 kg/m    Physical Exam   GENERAL: alert and no distress  EYES: Eyes grossly normal to inspection, PERRL and conjunctivae and sclerae normal  HENT: normal cephalic/atraumatic, ear canals and TM's normal, nose and mouth without ulcers or lesions, oropharynx clear, and oral mucous membranes moist  NECK: bilateral anterior cervical adenopathy, no asymmetry, masses, or scars, and thyroid normal to palpation  RESP: lungs clear to auscultation - no rales, rhonchi or wheezes  CV: regular rate and rhythm, normal S1 S2, no S3 or S4, no murmur, click or rub, no peripheral edema and peripheral pulses strong  ABDOMEN: soft, nontender, no hepatosplenomegaly, no masses and bowel sounds normal  MS: no gross musculoskeletal defects noted, no edema  SKIN: no suspicious lesions or rashes    Results for orders placed or performed in visit on 10/31/23   Influenza A & B Antigen - Clinic Collect     Status: Normal    Specimen: Nose; Swab   Result Value Ref Range    Influenza A antigen Negative Negative    Influenza B antigen Negative Negative    Narrative    Test results must be correlated with clinical data. If necessary, results should be confirmed by a molecular assay or viral culture.   Streptococcus A Rapid Screen w/Reflex to PCR - Clinic Collect     Status: Normal    Specimen: Throat; Swab   Result Value Ref Range    Group A Strep antigen Negative Negative

## 2023-11-01 ENCOUNTER — TELEPHONE (OUTPATIENT)
Dept: NURSING | Facility: CLINIC | Age: 48
End: 2023-11-01

## 2023-11-01 LAB
B BURGDOR IGG+IGM SER QL: 0.07
SARS-COV-2 RNA RESP QL NAA+PROBE: POSITIVE

## 2023-11-01 NOTE — TELEPHONE ENCOUNTER
Coronavirus (COVID-19) Notification    Caller Name (Patient, parent, daughter/son, grandparent, etc)  Patient    Reason for call  Notify of Positive Coronavirus (COVID-19) lab results, assess symptoms,  review [facility] recommendations    Lab Result    Lab test:  2019-nCoV rRt-PCR or SARS-CoV-2 PCR    Oropharyngeal AND/OR nasopharyngeal swabs is POSITIVE for 2019-nCoV RNA/SARS-COV-2 PCR (COVID-19 virus)      Gather patient reported symptoms   Assessment   Current Symptoms at time of phone call, reported by patient: (if no symptoms, document: No symptoms] No Symptoms (stiff joints and fatigue for a couple of days)   Date of symptom(s) onset (if applicable)  N/A     If at time of call, Patients symptoms have worsened, the Patient should contact 911 or have someone drive them to Emergency Dept promptly:    If Patient calling 911, inform 911 personal that you have tested positive for the Coronavirus (COVID-19).  Place mask on and await 911 to arrive.  If Emergency Dept, If possible, please have another adult drive you to the Emergency Dept but you need to wear mask when in contact with other people.      Treatment Options:   Is patient interested in discussing COVID treatment? No.        Review information with Patient    Your result was positive. This means you have COVID-19 (coronavirus).    How can I protect others?    These guidelines are for isolating before returning to work, school or .    If you DO have symptoms  Stay home and away from others   For at least 5 days after your symptoms started, AND  You are fever free for 24 hours (with no medicine that reduces fever), AND  Your other symptoms are better  Wear a mask for 10 full days anytime you are around others    If you DON'T have symptoms  Stay home and away from others for at least 5 days after your positive test  Wear a mask for 10 full days anytime you are around others    There may be different guidelines for healthcare facilities.  Please  check with the specific sites before arriving.    If you have been told by a doctor that you were severely ill with COVID-19 or are immunocompromised, you should isolate for at least 10 days.    You should not go back to work until you meet the guidelines above for ending your home isolation. You don't need to be retested for COVID-19 before going back to work--studies show that you won't spread the virus if it's been at least 10 days since your symptoms started (or 20 days, if you have a weak immune system).    Employers, schools, and daycares: This is an official notice for this person's medical guidelines for returning in-person.  They must meet the above guidelines before going back to work, school or  in person.    You will receive a positive COVID-19 letter via [facility] or the mail soon with additional self-care information.    Would you like me to review some of that information with you now?  No    If you were tested for an upcoming procedure, please contact your provider for next steps.    Yara Borden

## 2024-12-21 ENCOUNTER — HEALTH MAINTENANCE LETTER (OUTPATIENT)
Age: 49
End: 2024-12-21